# Patient Record
Sex: MALE | Race: BLACK OR AFRICAN AMERICAN | URBAN - METROPOLITAN AREA
[De-identification: names, ages, dates, MRNs, and addresses within clinical notes are randomized per-mention and may not be internally consistent; named-entity substitution may affect disease eponyms.]

---

## 2023-02-06 ENCOUNTER — WEB ENCOUNTER (OUTPATIENT)
Dept: URBAN - METROPOLITAN AREA CLINIC 96 | Facility: CLINIC | Age: 58
End: 2023-02-06

## 2023-02-06 ENCOUNTER — OFFICE VISIT (OUTPATIENT)
Dept: URBAN - METROPOLITAN AREA CLINIC 96 | Facility: CLINIC | Age: 58
End: 2023-02-06
Payer: COMMERCIAL

## 2023-02-06 ENCOUNTER — DASHBOARD ENCOUNTERS (OUTPATIENT)
Age: 58
End: 2023-02-06

## 2023-02-06 DIAGNOSIS — Z80.0 FAMILY HISTORY OF COLON CANCER: ICD-10-CM

## 2023-02-06 PROCEDURE — 99202 OFFICE O/P NEW SF 15 MIN: CPT | Performed by: INTERNAL MEDICINE

## 2023-02-06 RX ORDER — SODIUM, POTASSIUM,MAG SULFATES 17.5-3.13G
177 ML SOLUTION, RECONSTITUTED, ORAL ORAL AS DIRECTED
Qty: 1 BOX | Refills: 0 | OUTPATIENT
Start: 2023-02-06 | End: 2023-02-07

## 2023-02-06 NOTE — HPI-TODAY'S VISIT:
This patient was referred by Dr. Liz Gorman and Dr. Gale for an evaluation of FHX crc.  A copy of this will be sent to the referring provider.  57 y.o. AAF, works at [employer], lives in [place] Seen few yrs ago Suggested to f/u w/ me 15 min upon leaving work - head throbbing - pulled over - 911 knocked on window - had sobriety test - ? had a CVA - didn't go to hospital - able to make it home - self medicated - stayed at home for 3 days PCP rec see cardiologist and neurologist - appts scheduled PCP asked about last c-scope - older sister passed of crc No blood in stool No abd pain Wt stable Blood work w/ PCP WNL - no negative feedback Back to baseline - no issues now

## 2023-03-01 ENCOUNTER — LAB OUTSIDE AN ENCOUNTER (OUTPATIENT)
Dept: URBAN - METROPOLITAN AREA CLINIC 96 | Facility: CLINIC | Age: 58
End: 2023-03-01

## 2023-11-01 ENCOUNTER — LAB OUTSIDE AN ENCOUNTER (OUTPATIENT)
Dept: URBAN - METROPOLITAN AREA CLINIC 96 | Facility: CLINIC | Age: 58
End: 2023-11-01

## 2023-11-16 ENCOUNTER — OFFICE VISIT (OUTPATIENT)
Dept: URBAN - METROPOLITAN AREA SURGERY CENTER 19 | Facility: SURGERY CENTER | Age: 58
End: 2023-11-16

## 2024-10-31 ENCOUNTER — OFFICE VISIT (OUTPATIENT)
Dept: URBAN - METROPOLITAN AREA CLINIC 96 | Facility: CLINIC | Age: 59
End: 2024-10-31

## 2024-10-31 ENCOUNTER — LAB OUTSIDE AN ENCOUNTER (OUTPATIENT)
Dept: URBAN - METROPOLITAN AREA CLINIC 96 | Facility: CLINIC | Age: 59
End: 2024-10-31

## 2024-10-31 VITALS
WEIGHT: 227 LBS | HEART RATE: 91 BPM | HEIGHT: 64 IN | RESPIRATION RATE: 18 BRPM | BODY MASS INDEX: 38.76 KG/M2 | TEMPERATURE: 97.7 F | SYSTOLIC BLOOD PRESSURE: 148 MMHG | DIASTOLIC BLOOD PRESSURE: 88 MMHG

## 2024-10-31 PROBLEM — 312824007: Status: ACTIVE | Noted: 2024-10-31

## 2024-10-31 PROBLEM — 305058001: Status: ACTIVE | Noted: 2024-10-31

## 2024-10-31 NOTE — HPI-OTHER HISTORIES
Previously 2/2023 with Dr. Randolph: This patient was referred by Dr. Liz Gorman and Dr. Gale for an evaluation of FHX crc. A copy of this will be sent to the referring provider.  57 y.o. AAF, works at [employer], lives in [place] Seen few yrs ago Suggested to f/u w/ me 15 min upon leaving work - head throbbing - pulled over - 911 knocked on window - had sobriety test - ? had a CVA - didn't go to hospital - able to make it home - self medicated - stayed at home for 3 days PCP rec see cardiologist and neurologist - appts scheduled PCP asked about last c-scope - older sister passed of crc No blood in stool No abd pain Wt stable Blood work w/ PCP WNL - no negative feedback Back to baseline - no issues now

## 2024-12-13 ENCOUNTER — OFFICE VISIT (OUTPATIENT)
Dept: URBAN - METROPOLITAN AREA SURGERY CENTER 18 | Facility: SURGERY CENTER | Age: 59
End: 2024-12-13